# Patient Record
Sex: MALE | Race: WHITE | Employment: FULL TIME | URBAN - METROPOLITAN AREA
[De-identification: names, ages, dates, MRNs, and addresses within clinical notes are randomized per-mention and may not be internally consistent; named-entity substitution may affect disease eponyms.]

---

## 2017-08-15 ENCOUNTER — HOSPITAL ENCOUNTER (OUTPATIENT)
Dept: PHYSICAL THERAPY | Age: 37
Discharge: HOME | End: 2017-08-15
Payer: COMMERCIAL

## 2017-08-15 PROCEDURE — 97161 PT EVAL LOW COMPLEX 20 MIN: CPT | Performed by: PHYSICAL THERAPIST

## 2017-08-15 PROCEDURE — 97112 NEUROMUSCULAR REEDUCATION: CPT | Performed by: PHYSICAL THERAPIST

## 2017-08-15 NOTE — PROGRESS NOTES
PT INITIAL EVALUATION NOTE 2-15    Patient Name: Valerie Sanchez  Date:8/15/2017  : 1980  [x]  Patient  Verified  Payor: BLUE CROSS / Plan: [place] [id number] PPO / Product Type: PPO /    In time:8:00 am  Out time: 9:05 am  Total Treatment Time (min): 60  Visit #: 1     Treatment Area: Pain in left leg [M41.056]    SUBJECTIVE  Pain Level (0-10 scale): 0/10  Any medication changes, allergies to medications, adverse drug reactions, diagnosis change, or new procedure performed?: [] No    [x] Yes (see summary sheet for update)  Subjective:     Pt reports that he has been having some low back and generalized LE fatigue. He mostly feels this when he is running and has been swimming some with some feeling. He reports that he now has a 17 month old and has been feeling back pain when he bends over while holding the baby. He reports that he has trouble feeling his right arch when running. He will still feel tightness in his left lateral hip. He reports that his breathing and respiration is being affected.   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; no pain present  Living Situation: with wife and 17 month old daugter  Pt Goals: return to running and holding his daughter without pain or dysfunction  Barriers: none stated   Motivation: excellent  Substance use: alcohol in recreational usage   FABQ Score: 45(12)  Cognition: A & O x 5        OBJECTIVE/EXAMINATION     See ANTONIO evaluation scanned into chart     20 min Neuromuscular Re-education:  [x]  See flow sheet :   Rationale: improve coordination, improve balance and increase proprioception  to improve the patients ability to return to N ADL skills          With   [] TE   [] TA   [x] neuro   [] other: Patient Education: [x] Review HEP    [x] Progressed/Changed HEP based on:   [] positioning   [] body mechanics   [] transfers   [] heat/ice application    [] other:        Other Objective/Functional Measures: See FOTO scanned into chart    Pain Level (0-10 scale) post treatment: 0/10      ASSESSMENT:      [x]  See Plan of Care      Sarwat Leonard PT, DPT, [facility]  8/15/2017  8:06 AM

## 2017-08-15 NOTE — PROGRESS NOTES
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 Assessment/ nath information: Richard Mccloud has presented to the office today with complaints of low back pain, diffuse LE pain and difficulty with lifting activities with his daughter. 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       Evaluation Complexity History LOW Complexity : Zero comorbidities / personal factors that will impact the outcome / POC; Examination MEDIUM Complexity : 3 Standardized tests and measures addressing body structure, function, activity limitation and / or participation in recreation  ;Presentation LOW Complexity : Stable, uncomplicated  ;Clinical Decision Making MEDIUM Complexity : FOTO score of 26-74  Overall Complexity Rating: LOW     Problem List: pain affecting function, decrease ROM, decrease strength, impaired gait/ balance, decrease ADL/ functional abilitiies, decrease activity tolerance, decrease flexibility/ joint mobility and decrease transfer abilities   Treatment Plan may include any combination of the following: Therapeutic exercise, Therapeutic activities, Neuromuscular re-education, Gait/balance training, Manual therapy, Patient education, Functional mobility training, Home safety training and Stair training  Patient / Family readiness to learn indicated by: asking questions, trying to perform skills and interest  Persons(s) to be included in education: patient (P)  Barriers to Learning/Limitations: None  Patient Goal (s): to be stronger and prevent further pain  Patient Self Reported Health Status: excellent  Rehabilitation Potential: excellent    Short Term Goals: To be accomplished in 3 treatments:  1) Patient will demonstrate Negative Hruska Adduction Drop Test   2) Patient will demonstrate independence with HEP  3) Patient will demonstrate greater than Grade II on Hruska Adduction Lift Test    Long Term Goals: To be accomplished in 8 treatments:  1)Patient will demonstrate Grade IV or Grade V Hruska Adduction Lift Score to allow for functional mobility in home and community settings  2)Pt will demonstrate the ability to ambulate forward and backward achieving bilateral Acetabular Femoral Internal Rotation for pain free mobility  3)Pt will demonstrate the ability to run and jog without subjective complaints or gait deviation  4)Pt will demonstrate independence with discharge HEP to allow for ambulation, sitting and standing without objective dysfunction    Frequency / Duration: Patient to be seen 1 times per week for up to 8  weeks. Patient/ Caregiver education and instruction: self care, activity modification, exercises and other ANTONIO positioning    [x]  Plan of care has been reviewed with PTA Rexine Phalen, PT, DPT, [facility] 8/15/2017 8:15 AM    ________________________________________________________________________    I certify that the above Therapy Services are being furnished while the patient is under my care.  I agree with the treatment plan and certify that this therapy is necessary.     [de-identified] Signature:____________________  Date:____________Time: _________

## 2017-08-22 ENCOUNTER — HOSPITAL ENCOUNTER (OUTPATIENT)
Dept: PHYSICAL THERAPY | Age: 37
Discharge: HOME | End: 2017-08-22
Payer: COMMERCIAL

## 2017-08-22 PROCEDURE — 97112 NEUROMUSCULAR REEDUCATION: CPT | Performed by: PHYSICAL THERAPIST

## 2017-08-22 NOTE — PROGRESS NOTES
PT DAILY TREATMENT NOTE 2-15    Patient Name: Tracy Serrano  Date:2017  : 1980  [x]  Patient  Verified  Payor: BLUE CROSS / Plan: [facility] PPO / Product Type: PPO /    In time:11:30 am  Out time:12:30 pm  Total Treatment Time (min): 55  Visit #: 2     Treatment Area: Pain in left leg [M79.605]    SUBJECTIVE  Pain Level (0-10 scale): 0/10  Any medication changes, allergies to medications, adverse drug reactions, diagnosis change, or new procedure performed?: [x] No    [] Yes (see summary sheet for update)  Subjective functional status/changes:   [] No changes reported  Pt reports that his back is feeling much better. OBJECTIVE     55 min Neuromuscular Re-education:  [x]  See flow sheet :   Rationale: increase ROM, increase strength, improve coordination, improve balance and increase proprioception  to improve the patients ability to ambulate reciprocally            With   [] TE   [] TA   [x] neuro   [] other: Patient Education: [x] Review HEP    [x] Progressed/Changed HEP based on:   [] positioning   [] body mechanics   [] transfers   [] heat/ice application    [] other:      Other Objective/Functional Measures: - HGIR bilaterally, HaDLT bilaterally was weak 3/5     Pain Level (0-10 scale) post treatment: 0/10    ASSESSMENT/Changes in Function:     Patient will continue to benefit from skilled PT services to modify and progress therapeutic interventions, address functional mobility deficits, address ROM deficits, address strength deficits, analyze and address soft tissue restrictions, analyze and cue movement patterns, analyze and modify body mechanics/ergonomics, assess and modify postural abnormalities, address imbalance/dizziness and instruct in home and community integration to attain remaining goals.      []  See Plan of Care  []  See progress note/recertification  []  See Discharge Summary         Progress towards goals / Updated goals:  Pt was able to be progressed since initial visit as he was compliant with HEP at this time.     PLAN  []  Upgrade activities as tolerated     [x]  Continue plan of care  [x]  Update interventions per flow sheet       []  Discharge due to:_  []  Other:_      Jeff Baltazar, PT, DPT, [facility] 8/22/2017  2:27 PM

## 2017-09-01 ENCOUNTER — APPOINTMENT (OUTPATIENT)
Dept: PHYSICAL THERAPY | Age: 37
End: 2017-09-01
Payer: COMMERCIAL

## 2017-09-06 ENCOUNTER — HOSPITAL ENCOUNTER (OUTPATIENT)
Dept: PHYSICAL THERAPY | Age: 37
Discharge: HOME | End: 2017-09-06
Payer: COMMERCIAL

## 2017-09-06 PROCEDURE — 97112 NEUROMUSCULAR REEDUCATION: CPT | Performed by: PHYSICAL THERAPIST

## 2017-09-06 NOTE — PROGRESS NOTES
PT DAILY TREATMENT NOTE 2-15    Patient Name: Fay Ellington  Date:2017  : 1980  [x]  Patient  Verified  Payor: BLUE CROSS / Plan: [place] [id number] PPO / Product Type: PPO /    In time:12:00 pm  Out time:1:00 pm  Total Treatment Time (min): 60  Visit #: 3     Treatment Area: Pain in left leg [M79.605]    SUBJECTIVE  Pain Level (0-10 scale): 0/10  Any medication changes, allergies to medications, adverse drug reactions, diagnosis change, or new procedure performed?: [x] No    [] Yes (see summary sheet for update)  Subjective functional status/changes:   [] No changes reported  Pt reports that he isn't having pain except for some foot soreness. OBJECTIVE     60 min Neuromuscular Re-education:  [x]  See flow sheet :   Rationale: increase ROM, increase strength, improve coordination, improve balance and increase proprioception  to improve the patients ability to ambulate reciprocally            With   [] TE   [] TA   [x] neuro   [] other: Patient Education: [x] Review HEP    [x] Progressed/Changed HEP based on:   [] positioning   [] body mechanics   [] transfers   [] heat/ice application    [] other:      Other Objective/Functional Measures: - HGIR bilaterally, HaDLT bilaterally was strong 3/5     Pain Level (0-10 scale) post treatment: 0/10    ASSESSMENT/Changes in Function:     Patient will continue to benefit from skilled PT services to modify and progress therapeutic interventions, address functional mobility deficits, address ROM deficits, address strength deficits, analyze and address soft tissue restrictions, analyze and cue movement patterns, analyze and modify body mechanics/ergonomics, assess and modify postural abnormalities, address imbalance/dizziness and instruct in home and community integration to attain remaining goals. []  See Plan of Care  []  See progress note/recertification  []  See Discharge Summary         Progress towards goals / Updated goals:   All STG met today.    PLAN  []  Upgrade activities as tolerated     [x]  Continue plan of care  [x]  Update interventions per flow sheet       []  Discharge due to:_  []  Other:_      Duke Alvarez PT, DPT, [facility] 9/6/2017  2:27 PM

## 2017-09-13 ENCOUNTER — APPOINTMENT (OUTPATIENT)
Dept: PHYSICAL THERAPY | Age: 37
End: 2017-09-13
Payer: COMMERCIAL

## 2017-09-26 ENCOUNTER — HOSPITAL ENCOUNTER (OUTPATIENT)
Dept: PHYSICAL THERAPY | Age: 37
Discharge: HOME | End: 2017-09-26
Payer: COMMERCIAL

## 2017-09-26 PROCEDURE — 97112 NEUROMUSCULAR REEDUCATION: CPT | Performed by: PHYSICAL THERAPIST

## 2017-09-26 NOTE — PROGRESS NOTES
PT DAILY TREATMENT NOTE 2-15    Patient Name: Soledad Ruiz  Date:2017  : 1980  [x]  Patient  Verified  Payor: BLUE CROSS / Plan: [place] [employer] [id number] PPO / Product Type: PPO /    In time:12:00 pm  Out time:1:00 pm  Total Treatment Time (min): 60  Visit #: 4     Treatment Area: Pain in left leg [M79.215]    SUBJECTIVE  Pain Level (0-10 scale): 0/10  Any medication changes, allergies to medications, adverse drug reactions, diagnosis change, or new procedure performed?: [x] No    [] Yes (see summary sheet for update)  Subjective functional status/changes:   [] No changes reported  Pt reports that he has been able to run long distances without pain present    OBJECTIVE     55 min Neuromuscular Re-education:  [x]  See flow sheet :   Rationale: increase ROM, increase strength, improve coordination, improve balance and increase proprioception  to improve the patients ability to ambulate reciprocally            With   [] TE   [] TA   [x] neuro   [] other: Patient Education: [x] Review HEP    [x] Progressed/Changed HEP based on:   [] positioning   [] body mechanics   [] transfers   [] heat/ice application    [] other:      Other Objective/Functional Measures:   - HGIR bilaterally   HaDLT bilaterally was strong 3+/5     Pain Level (0-10 scale) post treatment: 0/10    ASSESSMENT/Changes in Function:     Patient will continue to benefit from skilled PT services to modify and progress therapeutic interventions, address functional mobility deficits, address ROM deficits, address strength deficits, analyze and address soft tissue restrictions, analyze and cue movement patterns, analyze and modify body mechanics/ergonomics, assess and modify postural abnormalities, address imbalance/dizziness and instruct in home and community integration to attain remaining goals. []  See Plan of Care  []  See progress note/recertification  []  See Discharge Summary         Progress towards goals / Updated goals:   All STG met today.   Progressing toward LTG with noted weakness bilateral LE with WB    PLAN  []  Upgrade activities as tolerated     [x]  Continue plan of care  [x]  Update interventions per flow sheet       []  Discharge due to:_  []  Other:_        Sarwat Leonard PT, DPT, [facility] 9/26/2017  2:27 PM

## 2017-10-13 ENCOUNTER — HOSPITAL ENCOUNTER (OUTPATIENT)
Dept: PHYSICAL THERAPY | Age: 37
Discharge: HOME | End: 2017-10-13
Payer: COMMERCIAL

## 2017-10-13 PROCEDURE — 97112 NEUROMUSCULAR REEDUCATION: CPT | Performed by: PHYSICAL THERAPIST

## 2017-10-13 NOTE — PROGRESS NOTES
PT DAILY TREATMENT NOTE 2-15    Patient Name: Carla Guthrie  Date:10/13/2017  : 1980  [x]  Patient  Verified  Payor: BLUE MAXWELL / Plan: [place] [employer] [id number] PPO / Product Type: PPO /    In time:12:00 pm  Out time:1:00 pm  Total Treatment Time (min): 60  Visit #: 5     Treatment Area: Pain in left leg [M79.065]    SUBJECTIVE  Pain Level (0-10 scale): 0/10  Any medication changes, allergies to medications, adverse drug reactions, diagnosis change, or new procedure performed?: [x] No    [] Yes (see summary sheet for update)  Subjective functional status/changes:   [] No changes reported  Pt reports that he is doing well and is running much better. OBJECTIVE     55 min Neuromuscular Re-education:  [x]  See flow sheet :   Rationale: increase ROM, increase strength, improve coordination, improve balance and increase proprioception  to improve the patients ability to ambulate reciprocally            With   [] TE   [] TA   [x] neuro   [] other: Patient Education: [x] Review HEP    [x] Progressed/Changed HEP based on:   [] positioning   [] body mechanics   [] transfers   [] heat/ice application    [] other:      Other Objective/Functional Measures:   - HGIR bilaterally   HaDLT bilaterally 3+/5  - PADT bilaterally     Pain Level (0-10 scale) post treatment: 0/10    ASSESSMENT/Changes in Function:     Patient will continue to benefit from skilled PT services to modify and progress therapeutic interventions, address functional mobility deficits, address ROM deficits, address strength deficits, analyze and address soft tissue restrictions, analyze and cue movement patterns, analyze and modify body mechanics/ergonomics, assess and modify postural abnormalities, address imbalance/dizziness and instruct in home and community integration to attain remaining goals.      []  See Plan of Care  []  See progress note/recertification  []  See Discharge Summary         Progress towards goals / Updated goals:  Pt has met LTG 1 and 2 per POC    PLAN  []  Upgrade activities as tolerated     [x]  Continue plan of care  [x]  Update interventions per flow sheet       []  Discharge due to:_  [x]  Other: likely D/C at next visit.         Kade Price, PT, DPT, [facility] 10/13/2017  2:27 PM

## 2018-01-18 NOTE — ANCILLARY DISCHARGE INSTRUCTIONS
[address]  Phone: [phone]  Fax: [phone]    Discharge Summary  2-15    Patient name: Mia Rose  : 1980  Provider#: [id number]  Referral source: Referred, Self, MD      Medical/Treatment Diagnosis: Pain in left leg [M79.605]     Prior Hospitalization: see medical history     Comorbidities: See Plan of Care  Prior Level of Function:See Plan of Care  Medications: Verified on Patient Summary List    Start of Care: 8/15/2017     Onset Date:ongoing for several months prior to eval   Visits from Start of Care: 5     Missed Visits: 0  Reporting Period : 8/15/2017 to 10/13/2017      ASSESSMENT/SUMMARY OF CARE: Gentry participated and progressed well in treatment up to 5th visit. He was not seen past this visit secondary to work and home obligations and will return PRN as his schedule allows.       Goal:1) Patient will demonstrate Negative Hruska Adduction Drop Test   Status at last note/certification:  Status at discharge: met    Goal:2) Patient will demonstrate independence with HEP  Status at last note/certification:  Status at discharge: met    Goal:3) Patient will demonstrate greater than Grade II on Hruska Adduction Lift Test  Status at last note/certification:  Status at discharge: met    Long Term goals: Progressing toward all at the time of last visit  1)Patient will demonstrate Grade IV or Grade V Hruska Adduction Lift Score to allow for functional mobility in home and community settings  2)Pt will demonstrate the ability to ambulate forward and backward achieving bilateral Acetabular Femoral Internal Rotation for pain free mobility  3)Pt will demonstrate the ability to run without subjective complaints or gait deviation  4)Pt will demonstrate independence with discharge HEP to allow for ambulation, sitting and standing without objective dysfunction        RECOMMENDATIONS:  [x]Discontinue therapy: []Patient has reached or is progressing toward set goals      [x]Patient is non-compliant or has abdicated secondary to life circumstances      []Due to lack of appreciable progress towards set goals      []Other    Anurag Kate, PT 1/18/2018 5:04 PM

## 2018-10-15 ENCOUNTER — HOSPITAL ENCOUNTER (OUTPATIENT)
Dept: PHYSICAL THERAPY | Age: 38
Discharge: HOME | End: 2018-10-15
Payer: COMMERCIAL

## 2018-10-15 PROCEDURE — 97112 NEUROMUSCULAR REEDUCATION: CPT | Performed by: PHYSICAL THERAPIST

## 2018-10-15 PROCEDURE — 97161 PT EVAL LOW COMPLEX 20 MIN: CPT | Performed by: PHYSICAL THERAPIST

## 2018-10-15 NOTE — PROGRESS NOTES
Plan of Care/ Statement of Necessity for Physical Therapy Services Patient name: Wanda Vang  : 1980  Provider#: [id number] Referral source: Referred, Self, MD     
Medical/Treatment Diagnosis: Left leg pain [M79.605] Prior Hospitalization: see medical history Comorbidities: alcohol usage, visual impairment Prior Level of Function: running, , childcare, home care Medications: Verified on Patient Summary List 
 
Start of Care: 10/15/2018      Onset Date: 1 month ago The Plan of Care and following information is based on the information from the initial evaluation. Assessment/ key information: Isai Roy presents to our office with a need to resume PT for R LE and right low back pain that is returning and he is unable to resolve this himself with a HEP. He will benefit from skilled PT prior to D/C to address the below and allow return to premorbid status with no restrictions present. Problem List: pain affecting function, decrease ROM, decrease strength, edema affecting function, impaired gait/ balance, decrease ADL/ functional abilitiies, decrease activity tolerance and decrease transfer abilities Treatment Plan may include any combination of the following: Neuromuscular re-education, Manual therapy, Patient education, Self Care training, Functional mobility training, Home safety training and Stair training Patient / Family readiness to learn indicated by: asking questions, trying to perform skills and interest 
Persons(s) to be included in education: patient (P) Barriers to Learning/Limitations: None Patient Goal (s): to be able to run without pain Patient Self Reported Health Status: good Rehabilitation Potential: excellent Short Term Goals:  To be accomplished in 3 treatments: 
1) Patient will demonstrate Negative Hruska Adduction Drop Test  
2) Patient will demonstrate independence with HEP 
 3) Patient will demonstrate greater than Grade II on Hruska Adduction Lift Test 
 
Long Term Goals: To be accomplished in 10 treatments: 
1)Patient will demonstrate Grade IV or Grade V Hruska Adduction Lift Score to allow for functional mobility in home and community settings 2)Pt will demonstrate the ability to ambulate forward and backward achieving bilateral Acetabular Femoral Internal Rotation for pain free mobility 3)Pt will demonstrate the ability to run and climb stairs without subjective complaints or gait deviation 4)Pt will demonstrate independence with discharge HEP to allow for ambulation, sitting and standing without objective dysfunction Frequency / Duration: Patient to be seen 1 times per week for up to10 treatments. Patient/ Caregiver education and instruction: self care, activity modification and exercises 
 
[x]  Plan of care has been reviewed with JAY Chen, PT, DPT, [facility] 10/15/2018 10:34 AM 
 
________________________________________________________________________ I certify that the above Therapy Services are being furnished while the patient is under my care. I agree with the treatment plan and certify that this therapy is necessary. [de-identified] Signature:____________________  Date:____________Time: _________

## 2018-10-15 NOTE — PROGRESS NOTES
PT INITIAL EVALUATION NOTE - [facility] 2-15 Patient Name: Manoj Fink Date:10/15/2018 : 1980 [x]  Patient  Verified Payor: BLUE CROSS / Plan: [facility] PPO / Product Type: PPO / In time:10:30 am  Out time:11:30 am 
Total Treatment Time (min): 60 Total Timed Codes (min): 15 
1:1 Treatment Time ( only): 6-  
Visit #: 1 Treatment Area: Left leg pain [M79.609] SUBJECTIVE Pain Level (0-10 scale): 0-1/10 Any medication changes, allergies to medications, adverse drug reactions, diagnosis change, or new procedure performed?: [] No    [x] Yes (see summary sheet for update) Subjective:   
Rebecca Strong states that he is having right sided low back pain, right hip pain and is unable to run as much as he would like to be able to. He is working a lot and is sitting a lot with this work. He feels like he needs to get back to do more specific ANTONIO exercises to combat this. 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 Barriers: none noted Motivation: good to excellent Substance use: alcohol usage FABQ Score:  See FOTO scanned into chart Cognition: A & O x 4 OBJECTIVE/EXAMINATION Postural Restoration [facility]-ER) Evaluation Left   Right Standing Standing Reach Test (M)    5 inches Functional Squat Test  (P)    Level 3-4 with v.c.    
 
Sitting FA IR R.O.M. (M)     41 degrees  48 degrees FA ER R.O.M.  (M)     51 degrees  42 degrees FA IR Strength (M)          
FA ER Strength (M) Sidelying Adduction Drop Test (M)    +   + Hruska Adduction Lift Test (M)   3   2 Hruska Abduction Lift Test (M) Pelvic Baldwin Drop Test (PADT) (P)  +   - 
 Passive Abduction Raise Test (PART) (P) Passive IP ER Expansion Test (P) Supine Extension Drop Test (M) Trunk Rotation (M) Straight Leg Raise (M) POSTURAL RESPIRATION EXAMINATION Left   Right Apical Expansion (R)     Limited bilaterally   * Horizontal Abduction (R)    30 degrees  15 degrees Shoulder Flexion (R)      degrees   degrees HG IR (R)      73 degrees  57 degrees Subclavius Flexibility (R)    Limited  X  Limited X Elevated and Externally Rotated Ant. Ribs (R) Min bilaterally CERVICAL-CRANIO-MANDIBULAR EXAM Left   Right Cervical Axial Rotation    60 degrees  80 degrees Horizontal Abduction      degrees   degrees Mandibular Opening      mm Mandibular Lateral Trusion with Protrusion Cervical Extension     Min limited 20 min Neuromuscular Re-education:  [x]  See flow sheet :  
Rationale: increase ROM, increase strength, improve coordination, improve balance and increase proprioception  to improve the patients ability to return to N walking without pain presnet With 
 [] TE 
 [] TA [x] neuro 
 [] other: Patient Education: [x] Review HEP [] Progressed/Changed HEP based on:  
[] positioning   [] body mechanics   [] transfers   [] heat/ice application   
[] other:   
 
Other Objective/Functional Measures: see FOTO scanned into chart Pain Level (0-10 scale) post treatment: 0/10 ASSESSMENT/Changes in Function:  
 
[x]  See Plan of Care Genny Dye PT, DPT, [facility] 10/15/2018  10:33 AM

## 2018-10-29 ENCOUNTER — APPOINTMENT (OUTPATIENT)
Dept: PHYSICAL THERAPY | Age: 38
End: 2018-10-29
Payer: COMMERCIAL

## 2018-11-14 ENCOUNTER — HOSPITAL ENCOUNTER (OUTPATIENT)
Dept: PHYSICAL THERAPY | Age: 38
Discharge: HOME | End: 2018-11-14
Payer: COMMERCIAL

## 2018-11-14 PROCEDURE — 97112 NEUROMUSCULAR REEDUCATION: CPT | Performed by: PHYSICAL THERAPIST

## 2018-11-14 NOTE — PROGRESS NOTES
PT DAILY TREATMENT NOTE 2-15 Patient Name: Thompson Ahumada Date:2018 : 1980 [x]  Patient  Verified Payor: BLUE CROSS / Plan: [facility] PPO / Product Type: PPO / In time:10:00 am  Out time:11:00 am 
Total Treatment Time (min): 60 Visit #: 2 Treatment Area: Left leg pain [M79.605] SUBJECTIVE Pain Level (0-10 scale): 0/10 Any medication changes, allergies to medications, adverse drug reactions, diagnosis change, or new procedure performed?: [x] No    [] Yes (see summary sheet for update) Subjective functional status/changes:   [] No changes reported Pt reports that he is running well and not having a lot of pain. OBJECTIVE 60 min Neuromuscular Re-education:  [x]  See flow sheet :  
Rationale: increase strength, improve coordination, improve balance and increase proprioception  to improve the patients ability to return to N ADL skills With 
 [] TE 
 [] TA [x] neuro 
 [] other: Patient Education: [x] Review HEP [x] Progressed/Changed HEP based on:  
[] positioning   [] body mechanics   [] transfers   [] heat/ice application   
[] other:   
 
Other Objective/Functional Measures:  
- HGIR bilaterally 
- HaDDT bilaterally - PADT bilaterally Level 3 lift HaDLT bilaterally Apical expansion min limited Pain Level (0-10 scale) post treatment: 0/10 ASSESSMENT/Changes in Function:  
Pt demonstrates excellent compliance with HEP. Patient will continue to benefit from skilled PT services to modify and progress therapeutic interventions, address functional mobility deficits, address ROM deficits, address strength deficits, analyze and address soft tissue restrictions, analyze and cue movement patterns, analyze and modify body mechanics/ergonomics, assess and modify postural abnormalities, address imbalance/dizziness and instruct in home and community integration to attain remaining goals. []  See Plan of Care []  See progress note/recertification 
[]  See Discharge Summary Progress towards goals / Updated goals: 
Pt has progressed well with HEP. PLAN 
[]  Upgrade activities as tolerated     [x]  Continue plan of care 
[]  Update interventions per flow sheet      
[]  Discharge due to:_ 
[]  Other:_ Hiram Holland, PT, DPT, [facility]  11/14/2018  12:59 PM

## 2018-11-28 ENCOUNTER — APPOINTMENT (OUTPATIENT)
Dept: PHYSICAL THERAPY | Age: 38
End: 2018-11-28
Payer: COMMERCIAL

## 2018-12-12 ENCOUNTER — APPOINTMENT (OUTPATIENT)
Dept: PHYSICAL THERAPY | Age: 38
End: 2018-12-12

## 2019-01-03 ENCOUNTER — HOSPITAL ENCOUNTER (OUTPATIENT)
Dept: PHYSICAL THERAPY | Age: 39
Discharge: HOME | End: 2019-01-03
Payer: COMMERCIAL

## 2019-01-03 PROCEDURE — 97112 NEUROMUSCULAR REEDUCATION: CPT | Performed by: PHYSICAL THERAPIST

## 2019-01-03 NOTE — PROGRESS NOTES
PT DAILY TREATMENT NOTE 2-15 Patient Name: Soledad Ruiz Date:1/3/2019 : 1980 [x]  Patient  Verified Payor: BLUE CROSS / Plan: [facility] PPO / Product Type: PPO / In time:9:00 am  Out time:9:45 am 
Total Treatment Time (min): 45 Visit #: 3 Treatment Area: Left leg pain [M79.605] SUBJECTIVE Pain Level (0-10 scale): 0/10 Any medication changes, allergies to medications, adverse drug reactions, diagnosis change, or new procedure performed?: [x] No    [] Yes (see summary sheet for update) Subjective functional status/changes:   [] No changes reported Pt reports that he injured his L knee when he was loading his daughter into his car. This has improved recently and has been able to resume running. He thinks he 'sprained' his knee. OBJECTIVE 45 min Neuromuscular Re-education:  [x]  See flow sheet :  
Rationale: increase strength, improve coordination, improve balance and increase proprioception  to improve the patients ability to return to N ADL skills and running without gait deviation present With 
 [] TE 
 [] TA [x] neuro 
 [] other: Patient Education: [x] Review HEP [x] Progressed/Changed HEP based on:  
[] positioning   [] body mechanics   [] transfers   [] heat/ice application   
[] other:   
 
Other Objective/Functional Measures:  
- HGIR bilaterally 
- HaDDT bilaterally - PADT bilaterally Level 3+ to weak 4 lift HaDLT bilaterally Apical expansion improved and only min limited Squat testing strong 4 to weak level 5 Pain Level (0-10 scale) post treatment: 0/10 ASSESSMENT/Changes in Function:  
Pt demonstrates excellent compliance with HEP at [facility].  
Patient will continue to benefit from skilled PT services to modify and progress therapeutic interventions, address functional mobility deficits, address ROM deficits, address strength deficits, analyze and address soft tissue restrictions, analyze and cue movement patterns, analyze and modify body mechanics/ergonomics, assess and modify postural abnormalities, address imbalance/dizziness and instruct in home and community integration to attain remaining goals. []  See Plan of Care [x]  See progress note/recertification 
[]  See Discharge Summary Progress towards goals / Updated goals: 
Excellent progress noted and pt is progressing well with HEP. He is progressing well with running planning and however is not doing well with running secondary to L knee injury sustained when loading daughter into his car. PLAN 
[]  Upgrade activities as tolerated     [x]  Continue plan of care [x]  Update interventions per flow sheet      
[]  Discharge due to:_ 
[]  Other:_ Limmie Lesch, PT, DPT, [facility]  1/3/2019  10:03 AM

## 2019-01-03 NOTE — PROGRESS NOTES
Progress Notes Patient name: Tanvi Yoder  : 1980  Provider#: [id number] Referral source: Radha Reynoso MD     
Medical/Treatment Diagnosis: Left leg pain [M79.605] Prior Hospitalization: see medical history Comorbidities: alcohol usage, visual impairment Prior Level of Function: running, , childcare, home care Medications: Verified on Patient Summary List 
 
Start of Care: 10/15/2018      Visits Since Start of Care: 3 Assessment/ key information: Sara Dunbar has progressed well with current POC and HEP and is able to ambulate without pain present. He will cont to progress with current POC toward the attainment of LTG and ability to perform all IADL skills without pain or compensation. Problem List: pain affecting function, decrease ROM, decrease strength, edema affecting function, impaired gait/ balance, decrease ADL/ functional abilitiies, decrease activity tolerance and decrease transfer abilities Treatment Plan may include any combination of the following: Neuromuscular re-education, Manual therapy, Patient education, Self Care training, Functional mobility training, Home safety training and Stair training Short Term Goals: To be accomplished in 3 treatments: ALL HAVE BEEN MET 
1) Patient will demonstrate Negative Hruska Adduction Drop Test  
2) Patient will demonstrate independence with HEP 3) Patient will demonstrate greater than Grade II on Hruska Adduction Lift Test 
 
Long Term Goals: To be accomplished in 10 treatments:  
1)Patient will demonstrate Grade IV or Grade V Hruska Adduction Lift Score to allow for functional mobility in home and community settings PARTIALLY MET 
2)Pt will demonstrate the ability to ambulate forward and backward achieving bilateral Acetabular Femoral Internal Rotation for pain free mobility MET INTERMITTANTLY 3)Pt will demonstrate the ability to run and climb stairs without subjective complaints or gait deviation NOT MET 
 4)Pt will demonstrate independence with discharge HEP to allow for ambulation, sitting and standing without objective dysfunction MET FOR CURRENT HEP Frequency / Duration: Patient to be seen 1 times per week for up to 4 more treatments. Patient/ Caregiver education and instruction: self care, activity modification and exercises Rexine Phalen, PT, DPT, [facility] 1/3/2019 10:34 AM

## 2019-01-25 ENCOUNTER — HOSPITAL ENCOUNTER (OUTPATIENT)
Dept: PHYSICAL THERAPY | Age: 39
Discharge: HOME | End: 2019-01-25
Payer: COMMERCIAL

## 2019-01-25 PROCEDURE — 97112 NEUROMUSCULAR REEDUCATION: CPT | Performed by: PHYSICAL THERAPIST

## 2019-01-25 NOTE — PROGRESS NOTES
PT DAILY TREATMENT NOTE 2-15 Patient Name: Kristie Turner Date:2019 : 1980 [x]  Patient  Verified Payor: BLUE CROSS / Plan: [facility] PPO / Product Type: PPO / In time:9:10 am  Out time:9:55 am 
Total Treatment Time (min): 45 Visit #: 4 Treatment Area: Left leg pain [M79.605] SUBJECTIVE Pain Level (0-10 scale): 2/10 Any medication changes, allergies to medications, adverse drug reactions, diagnosis change, or new procedure performed?: [x] No    [] Yes (see summary sheet for update) Subjective functional status/changes:   [] No changes reported Pt reports that he is feeling like he is getting L glute fatigue and accompanying L knee pain early on in his run and is feeling like it is because he has not been able to do a whole lot of his HEP or anything relative to that since he has had a sinus infection. OBJECTIVE 45 min Neuromuscular Re-education:  [x]  See flow sheet :  
Rationale: increase strength, improve coordination, improve balance and increase proprioception  to improve the patients ability to return to N ADL skills and running without gait deviation present With 
 [] TE 
 [] TA [x] neuro 
 [] other: Patient Education: [x] Review HEP [x] Progressed/Changed HEP based on:  
[] positioning   [] body mechanics   [] transfers   [] heat/ice application   
[] other:   
 
Other Objective/Functional Measures:  
- HGIR bilaterally 
- HaDDT L and + on R Level 3 to HaDLT bilaterally Apical more limited than at last visit Post testing of all measurements indicated improvement and 'clearing' of all testing. Pain Level (0-10 scale) post treatment: 0/10 ASSESSMENT/Changes in Function:  
Pt demonstrates moderate compliance with HEP and will benefit from altered HEP following today's visit.  
 
Patient will continue to benefit from skilled PT services to modify and progress therapeutic interventions, address functional mobility deficits, address ROM deficits, address strength deficits, analyze and address soft tissue restrictions, analyze and cue movement patterns, analyze and modify body mechanics/ergonomics, assess and modify postural abnormalities, address imbalance/dizziness and instruct in home and community integration to attain remaining goals. []  See Plan of Care [x]  See progress note from last visit/recertification 
[]  See Discharge Summary Progress towards goals / Updated goals: 
Pt has achieved all STG at this time. Long Term Goals: progressing toward all 1)Patient will demonstrate Grade IV or Grade V Hruska Adduction Lift Score to allow for functional mobility in home and community settings 2)Pt will demonstrate the ability to ambulate forward and backward achieving bilateral Acetabular Femoral Internal Rotation for pain free mobility 3)Pt will demonstrate the ability to run without subjective complaints or gait deviation 4)Pt will demonstrate independence with discharge HEP to allow for ambulation, sitting and standing without objective dysfunction PLAN 
[]  Upgrade activities as tolerated     [x]  Continue plan of care [x]  Update interventions per flow sheet      
[]  Discharge due to:_ 
[]  Other:_ Dayana Hammond, PT, DPT, [facility]  1/25/2019

## 2019-02-08 ENCOUNTER — HOSPITAL ENCOUNTER (OUTPATIENT)
Dept: PHYSICAL THERAPY | Age: 39
Discharge: HOME | End: 2019-02-08
Payer: COMMERCIAL

## 2019-02-08 PROCEDURE — 97014 ELECTRIC STIMULATION THERAPY: CPT | Performed by: PHYSICAL THERAPIST

## 2019-02-08 PROCEDURE — 97110 THERAPEUTIC EXERCISES: CPT | Performed by: PHYSICAL THERAPIST

## 2019-02-08 NOTE — PROGRESS NOTES
PT DAILY TREATMENT NOTE 2-15 Patient Name: Terry Chan Date:2019 : 1980 [x]  Patient  Verified Payor: BLUE CROSS / Plan: [facility] PPO / Product Type: PPO / In time:9:35 am  Out time: 10:45 am 
Total Treatment Time (min): 70 Visit #: 2 Treatment Area: Left leg pain [M79.605] SUBJECTIVE Pain Level (0-10 scale): 5-6/10 Any medication changes, allergies to medications, adverse drug reactions, diagnosis change, or new procedure performed?: [x] No    [] Yes (see summary sheet for update) Subjective functional status/changes:   [] No changes reported Pt reports L knee pain that is continuing and is keeping him from fully WB over L LE. He feels like there is something going on inside of his L knee. He will see the orthopod next week. OBJECTIVE 45 min Therapeutic Exercise:  [x]  See flow sheet : for L knee injury Rationale: increase strength, improve coordination, improve balance and increase proprioception  to improve the patients ability to return to N ADL skills and running without gait deviation present CP to post and ant L knee with 4 pad IFC to L ant knee in supine with L knee ext and N tissue olu x 15 minutes. With 
 [] TE 
 [] TA [x] neuro 
 [] other: Patient Education: [x] Review HEP [x] Progressed/Changed HEP based on:  
[] positioning   [] body mechanics   [] transfers   [] heat/ice application   
[] other:   
 
Other Objective/Functional Measures:  
L patellar mobility limited in clock directions Full knee ext L with boggy end feel L knee flex full with tightness present at end range L quad tone Fair Pain Level (0-10 scale) post treatment: 0/10 ASSESSMENT/Changes in Function:  
Pt demonstrates the possibiity of a L meniscus tear or internal derangement of some type. He will benefit from seeing an orthopod for diagnostics.  
 
Patient will continue to benefit from skilled PT services to modify and progress therapeutic interventions, address functional mobility deficits, address ROM deficits, address strength deficits, analyze and address soft tissue restrictions, analyze and cue movement patterns, analyze and modify body mechanics/ergonomics, assess and modify postural abnormalities, address imbalance/dizziness and instruct in home and community integration to attain remaining goals. []  See Plan of Care 
[]  See progress note from last visit/recertification 
[]  See Discharge Summary Progress towards goals / Updated goals: 
Pt has demonstrated limited progress since last visit secondary to L knee symptoms. Long Term Goals: progressing toward all 1)Patient will demonstrate Grade IV or Grade V Hruska Adduction Lift Score to allow for functional mobility in home and community settings 2)Pt will demonstrate the ability to ambulate forward and backward achieving bilateral Acetabular Femoral Internal Rotation for pain free mobility 3)Pt will demonstrate the ability to run without subjective complaints or gait deviation 4)Pt will demonstrate independence with discharge HEP to allow for ambulation, sitting and standing without objective dysfunction PLAN 
[]  Upgrade activities as tolerated     [x]  Continue plan of care [x]  Update interventions per flow sheet      
[]  Discharge due to:_ 
[x]  Other:_  To see orthopod on Wednesday Nisha Jimenez, PT, DPT, [facility]  2/8/2019

## 2019-02-20 ENCOUNTER — HOSPITAL ENCOUNTER (OUTPATIENT)
Dept: PHYSICAL THERAPY | Age: 39
Discharge: HOME | End: 2019-02-20
Payer: COMMERCIAL

## 2019-02-20 PROCEDURE — 97112 NEUROMUSCULAR REEDUCATION: CPT | Performed by: PHYSICAL THERAPIST

## 2019-02-20 NOTE — PROGRESS NOTES
PT DAILY TREATMENT NOTE 2-15 Patient Name: Candace Ortiz Date:2019 : 1980 [x]  Patient  Verified Payor: BLUE CROSS / Plan: [facility] PPO / Product Type: PPO / In time: 12:15 pm  Out time: 1:00 pm 
Total Treatment Time (min): 45 Visit #: 6 Treatment Area: Left leg pain [M79.605] SUBJECTIVE Pain Level (0-10 scale): 1/10 at rest; 5/10 at worst 
Any medication changes, allergies to medications, adverse drug reactions, diagnosis change, or new procedure performed?: [x] No    [] Yes (see summary sheet for update) Subjective functional status/changes:   [] No changes reported Pt reports that he saw Dr. Sahil Garcia after last visit and he diagnosed this as patellar tendonitis. He did an x-ray and this was negative. He will f/u with Dr. Sahil Garcia in a few weeks. OBJECTIVE 45 min Neuromuscular Re-education:  [x]  See flow sheet :   
Rationale: increase strength, improve coordination, improve balance and increase proprioception  to improve the patients ability to return to N ADL skills and running without gait deviation present With 
 [] TE 
 [] TA [x] neuro 
 [] other: Patient Education: [x] Review HEP [x] Progressed/Changed HEP based on:  
[] positioning   [] body mechanics   [] transfers   [] heat/ice application   
[] other:   
 
Other Objective/Functional Measures:  
L patellar mobility limited in clock directions in full knee extension 
+ bilateral HGIR 
+ HaDDT L > R HaDLT R 2, L 3- 
 
Pain Level (0-10 scale) post treatment: 0/10 ASSESSMENT/Changes in Function:  
Pt demonstrates that his is not maintaining neutrality and this deviation can be causing L knee pain at current time.  
 
Patient will continue to benefit from skilled PT services to modify and progress therapeutic interventions, address functional mobility deficits, address ROM deficits, address strength deficits, analyze and address soft tissue restrictions, analyze and cue movement patterns, analyze and modify body mechanics/ergonomics, assess and modify postural abnormalities, address imbalance/dizziness and instruct in home and community integration to attain remaining goals. []  See Plan of Care 
[]  See progress note from last visit/recertification 
[]  See Discharge Summary Progress towards goals / Updated goals: 
Pt has demonstrated limited progress since last visit secondary to L knee symptoms. Long Term Goals: progressing toward all 1)Patient will demonstrate Grade IV or Grade V Hruska Adduction Lift Score to allow for functional mobility in home and community settings 2)Pt will demonstrate the ability to ambulate forward and backward achieving bilateral Acetabular Femoral Internal Rotation for pain free mobility 3)Pt will demonstrate the ability to run without subjective complaints or gait deviation 4)Pt will demonstrate independence with discharge HEP to allow for ambulation, sitting and standing without objective dysfunction PLAN 
[]  Upgrade activities as tolerated     [x]  Continue plan of care [x]  Update interventions per flow sheet      
[]  Discharge due to:_ 
[x]  Other:_  F/u 2.25.2019 Shanna Montoya, PT, DPT, [facility]  2/20/2019

## 2019-02-25 ENCOUNTER — HOSPITAL ENCOUNTER (OUTPATIENT)
Dept: PHYSICAL THERAPY | Age: 39
Discharge: HOME | End: 2019-02-25
Payer: COMMERCIAL

## 2019-02-25 PROCEDURE — 97112 NEUROMUSCULAR REEDUCATION: CPT | Performed by: PHYSICAL THERAPIST

## 2019-02-25 NOTE — PROGRESS NOTES
PT DAILY TREATMENT NOTE 2-15 Patient Name: Soledad Ruiz Date:2019 : 1980 [x]  Patient  Verified Payor: BLUE CROSS / Plan: [facility] PPO / Product Type: PPO / In time: 3:40 pm  Out time: 4:25  pm 
Total Treatment Time (min): 45 Visit #: 0 Treatment Area: Left leg pain [M79.605] SUBJECTIVE Pain Level (0-10 scale): 2/10 at rest 
Any medication changes, allergies to medications, adverse drug reactions, diagnosis change, or new procedure performed?: [x] No    [] Yes (see summary sheet for update) Subjective functional status/changes:   [] No changes reported Pt reports that he was compliant with his HEP for several days since his last visit and tried to run yesterday making it about 5 blocks. OBJECTIVE 45 min Neuromuscular Re-education:  [x]  See flow sheet :   
Rationale: increase strength, improve coordination, improve balance and increase proprioception  to improve the patients ability to return to N ADL skills and running without gait deviation present With 
 [] TE 
 [] TA [x] neuro 
 [] other: Patient Education: [x] Review HEP [x] Progressed/Changed HEP based on:  
[] positioning   [] body mechanics   [] transfers   [] heat/ice application   
[] other:   
 
Other Objective/Functional Measures: L knee limited in end range flex and ext ROM 
- bilateral HGIR 
- HaDDT L and R HaDLT R 3, L 3 Pain Level (0-10 scale) post treatment: 0/10 ASSESSMENT/Changes in Function:  
Pt may have internal derangement of some type L knee. Pt will benefit from further diagnostic testing at this time.   
 
Patient will continue to benefit from skilled PT services to modify and progress therapeutic interventions, address functional mobility deficits, address ROM deficits, address strength deficits, analyze and address soft tissue restrictions, analyze and cue movement patterns, analyze and modify body mechanics/ergonomics, assess and modify postural abnormalities, address imbalance/dizziness and instruct in home and community integration to attain remaining goals. []  See Plan of Care [x]  See progress note/recertification 
[]  See Discharge Summary Progress towards goals / Updated goals: 
See progress note Long Term Goals: progressing toward 1)Patient will demonstrate Grade IV or Grade V Hruska Adduction Lift Score to allow for functional mobility in home and community settings 2)Pt will demonstrate the ability to ambulate forward and backward achieving bilateral Acetabular Femoral Internal Rotation for pain free mobility 3)Pt will demonstrate the ability to run without subjective complaints or gait deviation 4)Pt will demonstrate independence with discharge HEP to allow for ambulation, sitting and standing without objective dysfunction PLAN 
[]  Upgrade activities as tolerated     [x]  Continue plan of care 
[]  Update interventions per flow sheet      
[]  Discharge due to:_ 
[x]  Other:_  On hold pending diagnostic testing Jeff Baltazar, PT, DPT, [facility]  2/25/2019

## 2019-02-25 NOTE — PROGRESS NOTES
Progress Notes Patient name: Stacey Barnes  : 1980  Provider#: [id number] Referral source: Natacha Tellez MD     
Medical/Treatment Diagnosis: Left leg pain [M79.605] Prior Hospitalization: see medical history Comorbidities: alcohol usage, visual impairment Prior Level of Function: running, , childcare, home care Medications: Verified on Patient Summary List 
 
Start of Care: 10/15/2018      Visits Since Start of Care: 7 Assessment/ key information: Radha has stopped progressing wtih current POC secondary to possible internal derangement L knee. He will be f/u with the orthopod he saw, Dr. César Berrios, to determine further POC. Pt will be placed on hold at this time pending investigation of L knee. Problem List: pain affecting function, decrease ROM, decrease strength, edema affecting function, impaired gait/ balance, decrease ADL/ functional abilitiies, decrease activity tolerance and decrease transfer abilities Treatment Plan may include any combination of the following: Neuromuscular re-education, Manual therapy, Patient education, Self Care training, Functional mobility training, Home safety training and Stair training Short Term Goals: To be accomplished in 3 treatments: ALL HAVE BEEN MET 
1) Patient will demonstrate Negative Hruska Adduction Drop Test  
2) Patient will demonstrate independence with HEP 3) Patient will demonstrate greater than Grade II on Hruska Adduction Lift Test 
 
Long Term Goals: To be accomplished in 10 treatments:  
1)Patient will demonstrate Grade IV or Grade V Hruska Adduction Lift Score to allow for functional mobility in home and community settings PARTIALLY MET 
2)Pt will demonstrate the ability to ambulate forward and backward achieving bilateral Acetabular Femoral Internal Rotation for pain free mobility MET INTERMITTANTLY 3)Pt will demonstrate the ability to run and climb stairs without subjective complaints or gait deviation NOT MET 
4)Pt will demonstrate independence with discharge HEP to allow for ambulation, sitting and standing without objective dysfunction MET FOR CURRENT HEP Frequency / Duration: Patient to be seen 1 times per week for up to 4 more treatments. Patient/ Caregiver education and instruction: self care, activity modification and exercises Michael Coy, PT, DPT, [facility] 2/25/2019

## 2019-02-27 ENCOUNTER — APPOINTMENT (OUTPATIENT)
Dept: PHYSICAL THERAPY | Age: 39
End: 2019-02-27
Payer: COMMERCIAL

## 2019-08-01 NOTE — ANCILLARY DISCHARGE INSTRUCTIONS
Discharge Summary Patient name: Stacey Barnes  : 1980  Provider#: [id number] Referral source: Natacha Tellez MD     
Medical/Treatment Diagnosis: Left leg pain [M79.605] Prior Hospitalization: see medical history Comorbidities: alcohol usage, visual impairment Prior Level of Function: running, , childcare, home care Medications: Verified on Patient Summary List 
 
Start of Care: 10/15/2018      Visits Since Start of Care: 7 Assessment/ key information: Slim Madison has not been seen since 2019. He will continue with HEP and will return PRN. Short Term Goals: To be accomplished in 3 treatments: ALL HAVE BEEN MET at time of last visit 1) Patient will demonstrate Negative Hruska Adduction Drop Test  
2) Patient will demonstrate independence with HEP 3) Patient will demonstrate greater than Grade II on Hruska Adduction Lift Test 
 
Long Term Goals: To be accomplished in 10 treatments: (evaluated at time of last visit) 1)Patient will demonstrate Grade IV or Grade V Hruska Adduction Lift Score to allow for functional mobility in home and community settings PARTIALLY MET 
2)Pt will demonstrate the ability to ambulate forward and backward achieving bilateral Acetabular Femoral Internal Rotation for pain free mobility MET INTERMITTANTLY 3)Pt will demonstrate the ability to run and climb stairs without subjective complaints or gait deviation NOT MET 
4)Pt will demonstrate independence with discharge HEP to allow for ambulation, sitting and standing without objective dysfunction MET FOR CURRENT HEP Nael Hartley, PT, DPT, [facility] 2019

## 2019-10-16 ENCOUNTER — HOSPITAL ENCOUNTER (OUTPATIENT)
Dept: PHYSICAL THERAPY | Age: 39
Discharge: HOME | End: 2019-10-16
Payer: COMMERCIAL

## 2019-10-16 PROCEDURE — 97112 NEUROMUSCULAR REEDUCATION: CPT | Performed by: PHYSICAL THERAPIST

## 2019-10-16 PROCEDURE — 97161 PT EVAL LOW COMPLEX 20 MIN: CPT | Performed by: PHYSICAL THERAPIST

## 2019-10-16 NOTE — PROGRESS NOTES
PT INITIAL EVALUATION NOTE - [facility] 2-15    Patient Name: Cydney Dire  Date:10/16/2019  : 1980  [x]  Patient  Verified  Payor: BLUE CROSS / Plan: Berkleyzina KESHAV Sam [id number] PPO / Product Type: PPO /    In time: 1:00 pm  Out time: 2:00 pm  Total Treatment Time (min): 60  Total Timed Codes (min): 15  1:1 Treatment Time ( only): 15   Visit #: 1     Treatment Area: Pain in left knee [M25.562]    SUBJECTIVE  Pain Level (0-10 scale): 2/10  Any medication changes, allergies to medications, adverse drug reactions, diagnosis change, or new procedure performed?: [] No    [x] Yes (see summary sheet for update)  Subjective:    *See 2018 Evaluation*  Currently feeling like the L knee does not feel good and doesn't feel like it is normal. He reports that he is feeling like it isn't as stable and feels like he cannot squat and feels pressure under his L knee cap.   PLOF: running, work as , care of child, swimming, weights, cycling  Mechanism of Injury: insidious onste  Previous Treatment/Compliance: ANTONIO at this location  PMHx/Surgical Hx: see in [facility]  Work Hx: sedentary  Living Situation: with wife and young child with another child on the way  Pt Goals: no knee pain and the ability to be able to exercise without symptoms  Barriers: none noted  Motivation: good to excellent  Substance use: alcohol  FABQ Score: see FOTO  Cognition: A & O x 4        OBJECTIVE/EXAMINATION  Postural Restoration [place] (ANTONIO) Evaluation    Posture: bilateral knee hyperextended tendency with ant pelvic tilt  Other Observations: pt is of normal body weight              Left   Right  Standing  Standing Reach Test (M)    7 inches     Functional Squat Test  (P)    Level 3 with v.c.        Sitting  FA IR R.O.M. (M)     52 degrees  52 degrees  FA ER R.O.M.  (M)       FA IR Strength (M)       FA ER Strength (M)         Sidelying  Adduction Drop Test (M)    +   -  Hruska Adduction Lift Test (M)   1   2  Hruska Abduction Lift Test (M)        Pelvic [place] Drop Test (PADT) (P)  +   +  Passive Abduction Raise Test (PART) (P)       Passive IP ER Expansion Test (P)          Supine  Extension Drop Test (M)         Trunk Rotation (M)       Straight Leg Raise (M)       Apical Expansion (R)     Limited minimally     Horizontal Abduction (R)    30 degrees  60 degrees  Shoulder Flexion (R)       HG IR (R)      56 degrees  65 degrees  Subclavius Flexibility (R)    Min limited  Min limited  Elevated and Externally Rotated Ant.  Ribs (R) L > R       CERVICAL-CRANIO-MANDIBULAR  Cervical Axial Rotation       Mandibular Opening       Mandibular Lateral Trusion with Protrusion    Cervical Extension     Min flexible       15 min Neuromuscular Re-education:  []  See flow sheet :   Rationale: increase ROM, increase strength, improve coordination, improve balance and increase proprioception  to improve the patients ability to walk without pain        With   [] TE   [] TA   [x] neuro   [] other: Patient Education: [x] Review HEP    [x] Progressed/Changed HEP based on:   [] positioning   [] body mechanics   [] transfers   [] heat/ice application    [] other:      Other Objective/Functional Measures: see FOTO scanned into chart    Pain Level (0-10 scale) post treatment: 0/10    ASSESSMENT/Changes in Function:     [x]  See Plan of Care      Heena Munson, PT, DPT, [facility] 10/16/2019

## 2019-10-16 NOTE — PROGRESS NOTES
[address] [facility] [address]  Phone: [phone]  Fax: [phone]    Plan of Care/Statement of Necessity for Physical Therapy Services  2-15    Patient name: Edna Valdes  : 1980  Provider#: [id number]  Referral source: Referred, Self, MD      Medical/Treatment Diagnosis: Pain in left knee [M25.562]     Prior Hospitalization: see medical history     Comorbidities: visual impairment, previous back and knee pn  Prior Level of Function: full duty work in sedentary job, , running, cycling, weights, swimming  Medications: Verified on Patient Summary List  Start of Care: 10/16/2019      Onset Date: ongoing x several months   The Plan of Care and following information is based on the information from the initial evaluation. Assessment/ nath information: Leigh Ann Green presents to our office with postural dysfunction, limited bilateral reciprocal movment ability and inability to ambulate without pain. He will benefit from skilled PT prior to D/C to address the below, reduce knee pain and allow full ADL/IADL performance without restriction.     Evaluation Complexity History MEDIUM  Complexity : 1-2 comorbidities / personal factors will impact the outcome/ POC ; Examination HIGH Complexity : 4+ Standardized tests and measures addressing body structure, function, activity limitation and / or participation in recreation  ;Presentation LOW Complexity : Stable, uncomplicated  ;Clinical Decision Making MEDIUM Complexity : FOTO score of 26-74  Overall Complexity Rating: LOW     Problem List: pain affecting function, decrease ROM, decrease strength, impaired gait/ balance, decrease ADL/ functional abilitiies, decrease activity tolerance, decrease flexibility/ joint mobility and decrease transfer abilities   Treatment Plan may include any combination of the following: Therapeutic exercise, Therapeutic activities, Neuromuscular re-education, Gait/balance training, Manual therapy, Patient education, Self Care training, Functional mobility training, Home safety training and Stair training  Patient / Family readiness to learn indicated by: asking questions, trying to perform skills and interest  Persons(s) to be included in education: patient (P)  Barriers to Learning/Limitations: None  Patient Goal (s): to not have knee pain  Patient Self Reported Health Status: excellent  Rehabilitation Potential: good    Short Term Goals: To be accomplished in 3 treatments:  1) Patient will demonstrate Negative Hruska Adduction Drop Test   2) Patient will demonstrate independence with HEP  3) Patient will demonstrate greater than Grade II on Hruska Adduction Lift Test    Long Term Goals: To be accomplished in 10 treatments:  1)Patient will demonstrate Grade IV or Grade V Hruska Adduction Lift Score to allow for functional mobility in home and community settings  2)Pt will demonstrate the ability to ambulate forward and backward achieving bilateral Acetabular Femoral Internal Rotation for pain free mobility  3)Pt will demonstrate the ability to run without subjective complaints or gait deviation  4)Pt will demonstrate independence with discharge HEP to allow for ambulation, sitting and standing without objective dysfunction    Frequency / Duration: Patient to be seen 1 times per week for 10 treatments. Patient/ Caregiver education and instruction: self care, activity modification, brace/ splint application and exercises    [x]  Plan of care has been reviewed with PTA        Certification Period: 90 days    Lisa Gregorio, PT, DPT, [facility] 10/16/2019     ________________________________________________________________________    I certify that the above Therapy Services are being furnished while the patient is under my care. I agree with the treatment plan and certify that this therapy is necessary.     [de-identified] Signature:____________________  Date:____________Time: _________

## 2019-10-25 ENCOUNTER — HOSPITAL ENCOUNTER (OUTPATIENT)
Dept: PHYSICAL THERAPY | Age: 39
Discharge: HOME | End: 2019-10-25
Payer: COMMERCIAL

## 2019-10-25 PROCEDURE — 97112 NEUROMUSCULAR REEDUCATION: CPT | Performed by: PHYSICAL THERAPIST

## 2019-10-25 NOTE — PROGRESS NOTES
PT DAILY TREATMENT NOTE - [facility] 2-15    Patient Name: Saul Peterson  Date:10/25/2019  : 1980  [x]  Patient  Verified  Payor: Christian Melvin / Plan: [place] Sam [id number] PPO / Product Type: PPO /    In time:8:35 am   Out time:9:30 am   Total Treatment Time (min): 55  Total Timed Codes (min): 55  1:1 Treatment Time ( only): 45   Visit #:  2    Treatment Area: Pain in left knee [M25.562]    SUBJECTIVE  Pain Level (0-10 scale): 0/10  Any medication changes, allergies to medications, adverse drug reactions, diagnosis change, or new procedure performed?: [x] No    [] Yes (see summary sheet for update)  Subjective functional status/changes:   [] No changes reported  Pt reports that he had less knee pain when he ran the last few days. He does not have back tightness. His HEP went ok. OBJECTIVE     55 min Neuromuscular Re-education:  [x]  See flow sheet :   Rationale: increase ROM, increase strength, improve coordination, improve balance and increase proprioception  to improve the patients ability to return to N ADL skills            With   [] TE   [] TA   [] neuro   [] other: Patient Education: [x] Review HEP    [] Progressed/Changed HEP based on:   [] positioning   [] body mechanics   [] transfers   [] heat/ice application    [] other:      Other Objective/Functional Measures:   Beginning of session   - HGIR bilaterally  L Horiz Abd 45, R 65 (corrected to equal)  Min + HaDDT L, - R (corrected to - bilaterally)  Min + PADT L, - R (corrected to - bilaterally)     Pain Level (0-10 scale) post treatment: 0/10      ASSESSMENT/Changes in Function:   Pt was compliant with HEP since initial visit.      Patient will continue to benefit from skilled PT services to modify and progress therapeutic interventions, address functional mobility deficits, address ROM deficits, address strength deficits, analyze and address soft tissue restrictions, analyze and cue movement patterns, analyze and modify body mechanics/ergonomics, assess and modify postural abnormalities and instruct in home and community integration to attain remaining goals.      []  See Plan of Care  []  See progress note/recertification  []  See Discharge Summary         Progress towards goals / Updated goals:  Progressing toward all STG and LTG    PLAN  []  Upgrade activities as tolerated     [x]  Continue plan of care  [x]  Update interventions per flow sheet       []  Discharge due to:_  []  Other:_        Nelson Gonzalez PT, DPT, [facility]  10/25/2019

## 2019-11-01 ENCOUNTER — APPOINTMENT (OUTPATIENT)
Dept: PHYSICAL THERAPY | Age: 39
End: 2019-11-01

## 2019-12-10 ENCOUNTER — HOSPITAL ENCOUNTER (OUTPATIENT)
Dept: PHYSICAL THERAPY | Age: 39
Discharge: HOME | End: 2019-12-10
Payer: COMMERCIAL

## 2019-12-10 PROCEDURE — 97112 NEUROMUSCULAR REEDUCATION: CPT | Performed by: PHYSICAL THERAPIST

## 2019-12-10 NOTE — PROGRESS NOTES
PT DAILY TREATMENT NOTE - [facility] 2-15    Patient Name: Demar Marie  Date:12/10/2019  : 1980  [x]  Patient  Verified  Payor: BLUE CROSS / Plan: [place] Sam [id number] PPO / Product Type: PPO /    In time:10:35 am   Out time:11:30 am   Total Treatment Time (min): 55  Total Timed Codes (min): 55  1:1 Treatment Time ( only): 39   Visit #:  3    Treatment Area: Pain in left knee [M25.562]    SUBJECTIVE  Pain Level (0-10 scale): 0/10  Any medication changes, allergies to medications, adverse drug reactions, diagnosis change, or new procedure performed?: [x] No    [] Yes (see summary sheet for update)  Subjective functional status/changes:   [] No changes reported  Pt reports that he had shingles and also his wife had a baby. He has recently shifted shoes and feels like his knee pain might be a bit better. OBJECTIVE     55 min Neuromuscular Re-education:  [x]  See flow sheet :   Rationale: increase ROM, increase strength, improve coordination, improve balance and increase proprioception  to improve the patients ability to return to N ADL skills            With   [] TE   [] TA   [x] neuro   [] other: Patient Education: [x] Review HEP    [x] Progressed/Changed HEP based on:   [] positioning   [] body mechanics   [] transfers   [] heat/ice application    [] other:      Other Objective/Functional Measures:   Beginning of session   - HGIR bilaterally  L Horiz Abd 39, R 65 80 (corrected after intervention)  Min + HaDDT L, - R   Min + PADT L, - R   Apical expansion fair  Ambulation in clinic setting from neutral position with retesting: was out of neutral. Was not correct shoe for patient.      Pain Level (0-10 scale) post treatment: 0/10      ASSESSMENT/Changes in Function:   Patient will continue to benefit from skilled PT services to modify and progress therapeutic interventions, address functional mobility deficits, address ROM deficits, address strength deficits, analyze and address soft tissue restrictions, analyze and cue movement patterns, analyze and modify body mechanics/ergonomics, assess and modify postural abnormalities and instruct in home and community integration to attain remaining goals.      []  See Plan of Care  [x]  See progress note/recertification  []  See Discharge Summary         Progress towards goals / Updated goals:  See progress note    PLAN  []  Upgrade activities as tolerated     [x]  Continue plan of care  [x]  Update interventions per flow sheet       []  Discharge due to:_  []  Other:_        Pamella Cogan, PT, DPT, [facility]  12/10/2019

## 2019-12-10 NOTE — PROGRESS NOTES
[address]  Phone: [phone]  Fax: [phone]    Progress Note    Patient name: Guanako Lopez  : 1980  Provider#: [id number]  Referral source: Adela Leventhal, MD      Medical/Treatment Diagnosis: Pain in left knee [M25.562]     Prior Hospitalization: see medical history     Comorbidities: visual impairment, previous back and knee pn  Prior Level of Function: full duty work in sedentary job, , running, cycling, weights, swimming  Medications: Verified on Patient Summary List  Start of Care: 10/16/2019      Visits since start of care: 3        Assessment/ key information: Zachary Devries resumes PT today following a hiatus for his child's birth and also for a case of shingles. He will continue to require skilled PT for attainment of LTG and STG as listed below to allow for ADL and ambulation without pain present. Evaluation Complexity History MEDIUM  Complexity : 1-2 comorbidities / personal factors will impact the outcome/ POC ; Examination HIGH Complexity : 4+ Standardized tests and measures addressing body structure, function, activity limitation and / or participation in recreation  ;Presentation LOW Complexity : Stable, uncomplicated  ;Clinical Decision Making MEDIUM Complexity : FOTO score of 26-74  Overall Complexity Rating: LOW     Problem List: pain affecting function, decrease ROM, decrease strength, impaired gait/ balance, decrease ADL/ functional abilitiies, decrease activity tolerance, decrease flexibility/ joint mobility and decrease transfer abilities   Treatment Plan may include any combination of the following: Therapeutic exercise, Therapeutic activities, Neuromuscular re-education, Gait/balance training, Manual therapy, Patient education, Self Care training, Functional mobility training, Home safety training and Stair training      Short Term Goals:  To be accomplished in 3 treatments: progressing toward  1) Patient will demonstrate Negative Hruska Adduction Drop Test   2) Patient will demonstrate independence with HEP  3) Patient will demonstrate greater than Grade II on Hruska Adduction Lift Test    Long Term Goals: To be accomplished in 10 treatments:  1)Patient will demonstrate Grade IV or Grade V Hruska Adduction Lift Score to allow for functional mobility in home and community settings  2)Pt will demonstrate the ability to ambulate forward and backward achieving bilateral Acetabular Femoral Internal Rotation for pain free mobility  3)Pt will demonstrate the ability to run without subjective complaints or gait deviation  4)Pt will demonstrate independence with discharge HEP to allow for ambulation, sitting and standing without objective dysfunction    Frequency / Duration: Patient to be seen 1 times per week for 8 more treatments. Patient/ Caregiver education and instruction: self care, activity modification, brace/ splint application and exercises    [x]  Plan of care has been reviewed with PTA        Certification Period: 90 days from initial evaluation 10/16/19    Saeid Page PT, DPT, [facility] 12/10/2019     ________________________________________________________________________    I certify that the above Therapy Services are being furnished while the patient is under my care. I agree with the treatment plan and certify that this therapy is necessary.     [de-identified] Signature:____________________  Date:____________Time: _________

## 2019-12-16 ENCOUNTER — HOSPITAL ENCOUNTER (OUTPATIENT)
Dept: PHYSICAL THERAPY | Age: 39
Discharge: HOME | End: 2019-12-16
Payer: COMMERCIAL

## 2019-12-16 PROCEDURE — 97112 NEUROMUSCULAR REEDUCATION: CPT | Performed by: PHYSICAL THERAPIST

## 2019-12-16 NOTE — PROGRESS NOTES
PT DAILY TREATMENT NOTE - [facility] 2-15    Patient Name: Alvaro James  Date:2019  : 1980  [x]  Patient  Verified  Payor: BLUE CROSS / Plan: [place] Sam [id number] PPO / Product Type: PPO /    In time: 2:00 pm   Out time: 3:00 pm   Total Treatment Time (min): 60  Total Timed Codes (min): 50  1:1 Treatment Time ( only): 45   Visit #:  4    Treatment Area: Pain in left knee [M25.562]    SUBJECTIVE  Pain Level (0-10 scale): 0/10  Any medication changes, allergies to medications, adverse drug reactions, diagnosis change, or new procedure performed?: [x] No    [] Yes (see summary sheet for update)  Subjective functional status/changes:   [] No changes reported  Pt reports that he hasn't run and his knee is feeling a bit better. OBJECTIVE     60 min Neuromuscular Re-education:  [x]  See flow sheet :   Rationale: increase ROM, increase strength, improve coordination, improve balance and increase proprioception  to improve the patients ability to return to N ADL skills            With   [] TE   [] TA   [x] neuro   [] other: Patient Education: [x] Review HEP    [x] Progressed/Changed HEP based on:   [] positioning   [] body mechanics   [] transfers   [] heat/ice application    [] other:      Other Objective/Functional Measures:   Beginning of session   - HGIR bilaterally  L Horiz Abd 50 R 75 (corrected after intervention)   - HaDDT L, - R   - PADT L, - R   Apical expansion improving  Ambulation in clinic setting from neutral position with retesting:testing were good for maintaining neutrality in [employer] [employer].      Pain Level (0-10 scale) post treatment: 0/10      ASSESSMENT/Changes in Function:   Patient will continue to benefit from skilled PT services to modify and progress therapeutic interventions, address functional mobility deficits, address ROM deficits, address strength deficits, analyze and address soft tissue restrictions, analyze and cue movement patterns, analyze and modify body mechanics/ergonomics, assess and modify postural abnormalities and instruct in home and community integration to attain remaining goals.      []  See Plan of Care  [x]  See progress note/recertification  []  See Discharge Summary         Progress towards goals / Updated goals:  See progress note    PLAN  []  Upgrade activities as tolerated     [x]  Continue plan of care  [x]  Update interventions per flow sheet       []  Discharge due to:_  []  Other:_        Kirstie Weems, PT, DPT, [facility]  12/16/2019

## 2019-12-31 ENCOUNTER — HOSPITAL ENCOUNTER (OUTPATIENT)
Dept: PHYSICAL THERAPY | Age: 39
Discharge: HOME | End: 2019-12-31
Payer: COMMERCIAL

## 2019-12-31 PROCEDURE — 97116 GAIT TRAINING THERAPY: CPT | Performed by: PHYSICAL THERAPIST

## 2019-12-31 PROCEDURE — 97112 NEUROMUSCULAR REEDUCATION: CPT | Performed by: PHYSICAL THERAPIST

## 2020-01-08 ENCOUNTER — HOSPITAL ENCOUNTER (OUTPATIENT)
Dept: PHYSICAL THERAPY | Age: 40
Discharge: HOME | End: 2020-01-08
Payer: COMMERCIAL

## 2020-01-08 PROCEDURE — 97112 NEUROMUSCULAR REEDUCATION: CPT | Performed by: PHYSICAL THERAPIST

## 2020-01-08 NOTE — PROGRESS NOTES
PT DAILY TREATMENT NOTE - [facility] 2-15    Patient Name: Rah Copeland  Date:2020  : 1980  [x]  Patient  Verified  Payor: [employer] / Plan: [place] Sam [id number] PPO / Product Type: PPO /    In time: 10:00 am   Out time: 10:45 am   Total Treatment Time (min): 45  Total Timed Codes (min): 45  1:1 Treatment Time ( only): 45   Visit #:  6    Treatment Area: Pain in left knee [M25.562]    SUBJECTIVE  Pain Level (0-10 scale): 0/10  Any medication changes, allergies to medications, adverse drug reactions, diagnosis change, or new procedure performed?: [x] No    [] Yes (see summary sheet for update)  Subjective functional status/changes:   [] No changes reported  Pt reports that his back is feeling pretty tight, but he hasn't had a lot of knee pain. He has another pair of shoes that he would like to try out today.      OBJECTIVE     30 min Neuromuscular Re-education:  [x]  See flow sheet :   Rationale: increase ROM, increase strength, improve coordination, improve balance and increase proprioception  to improve the patients ability to return to N ADL skills    15 min Gait Training(neuromuscular re-education): shoe test and retest and UE swing as well as stance and swing phases of gait cued   Rationale: increase ROM, increase strength, improve coordination, improve balance and increase proprioception  to improve the patients ability to return to N ADL skills            With   [] TE   [] TA   [x] neuro   [] other: Patient Education: [x] Review HEP    [x] Progressed/Changed HEP based on:   [] positioning   [] body mechanics   [] transfers   [] heat/ice application    [] other:      Other Objective/Functional Measures:   Beginning of session   - HGIR bilaterally  L Horiz Abd 70 R 70 (L end feel was a bit tighter but distance was the same)  - HaDDT L, - R    - PADT L, - R   [employer] Gel Cumulus 21 is proper shoe for him       Pain Level (0-10 scale) post treatment: 0/10      ASSESSMENT/Changes in Function: Patient will continue to benefit from skilled PT services to modify and progress therapeutic interventions, address functional mobility deficits, address ROM deficits, address strength deficits, analyze and address soft tissue restrictions, analyze and cue movement patterns, analyze and modify body mechanics/ergonomics, assess and modify postural abnormalities and instruct in home and community integration to attain remaining goals.      []  See Plan of Care  []  See progress note/recertification  []  See Discharge Summary         Progress towards goals / Updated goals:  See above objective info  All STG met      PLAN  []  Upgrade activities as tolerated     [x]  Continue plan of care  [x]  Update interventions per flow sheet       []  Discharge due to:_  [x]  Other:_  Progress note at next visit      Yolanda Galeano, PT, DPT, [facility]  1/8/2020

## 2020-01-10 ENCOUNTER — APPOINTMENT (OUTPATIENT)
Dept: PHYSICAL THERAPY | Age: 40
End: 2020-01-10
Payer: COMMERCIAL

## 2020-03-27 NOTE — ANCILLARY DISCHARGE INSTRUCTIONS
[address] [place]  Phone: [phone]  Fax: [phone]    Discharge Summary  2-15    Patient name: Mariam Szymanski  : 1980  Provider#: [id number]  Referral source: Oniel Curtis MD      Medical/Treatment Diagnosis: Pain in left knee [M25.562]     Prior Hospitalization: see medical history     Comorbidities: See Plan of Care  Prior Level of Function:See Plan of Care  Medications: Verified on Patient Summary List    Start of Care: 10/16/2020                   Onset Date:ongoing for several months prior to evaluation    Visits from Start of Care: 6     Missed Visits: 2  Reporting Period : 10/16/2019 to 2020      ASSESSMENT/SUMMARY OF CARE: Nuris Tirado progressed well initially through his POC in PT, however following a hiatus for his child's birth and also for a case of shingles, progress was slow and ability to attend were limited by work restrictions. He continues to have knee pain for which he may seek out a second opinion with an orthopedic surgeon. At this time, he will be discharged from treatment.      Goal:1) Patient will demonstrate Negative Hruska Adduction Drop Test   Status at last note/certification:  Status at discharge: met    Goal:2) Patient will demonstrate independence with HEP  Status at last note/certification:  Status at discharge: met    Goal:3) Patient will demonstrate greater than Grade II on Hruska Adduction Lift Test  Status at last note/certification:  Status at discharge: met    Long Term Goals:   1)Patient will demonstrate Grade IV or Grade V Hruska Adduction Lift Score to allow for functional mobility in home and community settings  2)Pt will demonstrate the ability to ambulate forward and backward achieving bilateral Acetabular Femoral Internal Rotation for pain free mobility  3)Pt will demonstrate the ability to run without subjective complaints or gait deviation  4)Pt will demonstrate independence with discharge HEP to allow for ambulation, sitting and standing without objective dysfunction  Status at last note/certification: not met  Status at discharge: not met however was progressing toward slowly at last visit         RECOMMENDATIONS:  [x]Discontinue therapy: []Patient has reached or is progressing toward set goals      [x]Patient  has abdicated      []Due to lack of appreciable progress towards set goals      []Other    Sherron Escobar, PT, DPT, [facility]    3/27/2020

## 2024-01-10 ENCOUNTER — HOSPITAL ENCOUNTER (OUTPATIENT)
Facility: HOSPITAL | Age: 44
Setting detail: RECURRING SERIES
Discharge: HOME | End: 2024-01-13
Payer: COMMERCIAL

## 2024-01-10 PROCEDURE — 97161 PT EVAL LOW COMPLEX 20 MIN: CPT | Performed by: PHYSICAL THERAPIST

## 2024-01-10 PROCEDURE — 97112 NEUROMUSCULAR REEDUCATION: CPT | Performed by: PHYSICAL THERAPIST

## 2024-01-10 NOTE — THERAPY EVALUATION
Stim unattended, [id number] Gait Training, and [id number] Orthotic Management and Training  Patient / Family readiness to learn indicated by: asking questions, trying to perform skills, interest, return verbalization , and return demonstration   Persons(s) to be included in education: patient (P)  Barriers to Learning/Limitations: none  Measures taken if barriers to learning present: none  Patient Self Reported Health Status: excellent  Rehabilitation Potential: excellent    Short Term Goals: To be accomplished in 4 treatments.  Patient will demonstrate Negative Hruska Adduction Drop Test (Lico Test) to allow for sit to stand transfer without compensation or assistance needed   Patient will demonstrate independence with HEP without v.c. to allow for car transfers and showering without compensation or pain  Patient will demonstrate greater than Grade II on Hruska Adduction Lift Test to demonstrate ambulation of a minimum of 500 ft with min compensatory deviations and v.c. needed for form correction   Long Term Goals: To be accomplished in 16 treatments.  Patient will demonstrate Grade IV or Grade V Hruska Adduction Lift Score to allow for ambulation x 500 ft and mobility in home and community settings x 250 ft  Pt will demonstrate shoulder, neck and hip A/PROM bilaterally equal and without pain to allow for overhead reaching to a high shelf and a sit to stand transfer without UE assistance needed  Pt will demonstrate the ability to play with kids on the floor as evidenced by the achievement of Level 5 squat objective measure   Pt will demonstrate independence with discharge HEP, full AROM UE and LE, a min of 4/5 MMT to allow for ambulation, sitting and standing as required for all ADL and hygienic self care skills without objective dysfunction or assistance needed     Frequency / Duration: Patient to be seen 2 times per week for 16 treatments.    Patient/ Caregiver education and instruction: Diagnosis, prognosis, self care,

## 2024-01-19 ENCOUNTER — HOSPITAL ENCOUNTER (OUTPATIENT)
Facility: HOSPITAL | Age: 44
Setting detail: RECURRING SERIES
Discharge: HOME | End: 2024-01-22
Payer: COMMERCIAL

## 2024-01-19 PROCEDURE — 97112 NEUROMUSCULAR REEDUCATION: CPT | Performed by: PHYSICAL THERAPIST

## 2024-02-06 ENCOUNTER — HOSPITAL ENCOUNTER (OUTPATIENT)
Facility: HOSPITAL | Age: 44
Setting detail: RECURRING SERIES
Discharge: HOME | End: 2024-02-09
Payer: COMMERCIAL

## 2024-02-06 PROCEDURE — 97112 NEUROMUSCULAR REEDUCATION: CPT | Performed by: PHYSICAL THERAPIST

## 2024-02-06 NOTE — PROGRESS NOTES
PHYSICAL THERAPY - MEDICARE DAILY TREATMENT NOTE (updated 3/23)      Date: 2024          Patient Name:  Kelton Velarde :  1980   Medical   Diagnosis:  Muscle weakness (generalized) [M62.81] Treatment Diagnosis:  M25.551  RIGHT HIP PAIN    Referral Source:  Referral, Self Insurance:   Payor: CHRISTINA / Plan: [facility] BCJUL[facility] [place] / Product Type: *No Product type* /                     Patient  verified yes     Visit #   Current  / Total 3 16   Time   In / Out 1146 A 1230   Total Treatment Time 44   Total Timed Codes 44   1:1 Treatment Time 44      [facility] Totals Reminder:  bill using total billable   min of TIMED therapeutic procedures and modalities.   8-22 min = 1 unit; 23-37 min = 2 units; 38-52 min = 3 units; 53-67 min = 4 units; 68-82 min = 5 units            SUBJECTIVE    Pain Level (0-10 scale): 1-2/10    Any medication changes, allergies to medications, adverse drug reactions, diagnosis change, or new procedure performed?: [x] No    [] Yes (see summary sheet for update)  Medications: Verified on Patient Summary List    Subjective functional status/changes:     Pt reports that it is a bit better, however when trying to run 2 days consecutively, there is still R glute pn and still discomfort on his L side.    OBJECTIVE      Therapeutic Procedures:  Tx Min Billable or 1:1 Min (if diff from Tx Min) Procedure, Rationale, Specifics   44  [id number] Neuromuscular Re-Education (timed):  improve balance, coordination, kinesthetic sense, posture, core stability and proprioception to improve patient's ability to develop conscious control of individual muscles and awareness of position of extremities in order to progress to PLOF and address remaining functional goals. (see flow sheet as applicable)     Details if applicable:            Details if applicable:     44 44    Total Total               [x]  Patient Education billed concurrently with other procedures   [x] Review HEP    [] Progressed/Changed HEP, detail:    []

## 2024-02-20 ENCOUNTER — HOSPITAL ENCOUNTER (OUTPATIENT)
Facility: HOSPITAL | Age: 44
Setting detail: RECURRING SERIES
Discharge: HOME | End: 2024-02-23
Payer: COMMERCIAL

## 2024-02-20 PROCEDURE — 97112 NEUROMUSCULAR REEDUCATION: CPT | Performed by: PHYSICAL THERAPIST

## 2024-02-20 NOTE — PROGRESS NOTES
PHYSICAL THERAPY - MEDICARE DAILY TREATMENT NOTE (updated 3/23)      Date: 2024          Patient Name:  Kelton Velarde :  1980   Medical   Diagnosis:  Muscle weakness (generalized) [M62.81] Treatment Diagnosis:  M25.551  RIGHT HIP PAIN    Referral Source:  Referral, Self Insurance:   Payor: CHRISTINA / Plan: [facility] [place] / Product Type: *No Product type* /                     Patient  verified yes     Visit #   Current  / Total 4 16   Time   In / Out 848 A 935 A   Total Treatment Time 47   Total Timed Codes 47   1:1 Treatment Time 47      [facility] Totals Reminder:  bill using total billable   min of TIMED therapeutic procedures and modalities.   8-22 min = 1 unit; 23-37 min = 2 units; 38-52 min = 3 units; 53-67 min = 4 units; 68-82 min = 5 units            SUBJECTIVE    Pain Level (0-10 scale): 2/10    Any medication changes, allergies to medications, adverse drug reactions, diagnosis change, or new procedure performed?: [x] No    [] Yes (see summary sheet for update)  Medications: Verified on Patient Summary List    Subjective functional status/changes:     Pt reports some frustration that he took 4 days off and there was still R buttock discomfort when he went to try to run again. He has been doing his HEP regularly.    OBJECTIVE      Therapeutic Procedures:  Tx Min Billable or 1:1 Min (if diff from Tx Min) Procedure, Rationale, Specifics   47  68029 Neuromuscular Re-Education (timed):  improve balance, coordination, kinesthetic sense, posture, core stability and proprioception to improve patient's ability to develop conscious control of individual muscles and awareness of position of extremities in order to progress to PLOF and address remaining functional goals. (see flow sheet as applicable)     Details if applicable:            Details if applicable:     47 47    Total Total           [x]  Patient Education billed concurrently with other procedures   [x] Review HEP    [] Progressed/Changed HEP,

## 2024-03-06 ENCOUNTER — HOSPITAL ENCOUNTER (OUTPATIENT)
Facility: HOSPITAL | Age: 44
Setting detail: RECURRING SERIES
Discharge: HOME | End: 2024-03-09
Payer: COMMERCIAL

## 2024-03-06 PROCEDURE — 97112 NEUROMUSCULAR REEDUCATION: CPT | Performed by: PHYSICAL THERAPIST

## 2024-03-06 NOTE — PROGRESS NOTES
PHYSICAL THERAPY - MEDICARE DAILY TREATMENT NOTE (updated 3/23)      Date: 3/6/2024          Patient Name:  Kelton Velarde :  1980   Medical   Diagnosis:  Muscle weakness (generalized) [M62.81] Treatment Diagnosis:  M25.551  RIGHT HIP PAIN    Referral Source:  Referral, Self Insurance:   Payor: CHRISTINA / Plan: [facility] BC[facility] [place] / Product Type: *No Product type* /                     Patient  verified yes     Visit #   Current  / Total 5 16   Time   In / Out 1145 A 1230 P   Total Treatment Time 45   Total Timed Codes 45   1:1 Treatment Time 45      [facility] Totals Reminder:  bill using total billable   min of TIMED therapeutic procedures and modalities.   8-22 min = 1 unit; 23-37 min = 2 units; 38-52 min = 3 units; 53-67 min = 4 units; 68-82 min = 5 units            SUBJECTIVE    Pain Level (0-10 scale): 0/10    Any medication changes, allergies to medications, adverse drug reactions, diagnosis change, or new procedure performed?: [x] No    [] Yes (see summary sheet for update)  Medications: Verified on Patient Summary List    Subjective functional status/changes:     Pt reports that he is feeling better overall. He is able to run more with less pn. He does feel glute fatigue and some L HS discomfort somewhat, but has run 3 days in a row without a lot of trouble.     OBJECTIVE      Therapeutic Procedures:  Tx Min Billable or 1:1 Min (if diff from Tx Min) Procedure, Rationale, Specifics   45  [id number] Neuromuscular Re-Education (timed):  improve balance, coordination, kinesthetic sense, posture, core stability and proprioception to improve patient's ability to develop conscious control of individual muscles and awareness of position of extremities in order to progress to PLOF and address remaining functional goals. (see flow sheet as applicable)     Details if applicable:            Details if applicable:     45 45    Total Total           [x]  Patient Education billed concurrently with other procedures   [x] Review

## 2024-03-26 ENCOUNTER — HOSPITAL ENCOUNTER (OUTPATIENT)
Facility: HOSPITAL | Age: 44
Setting detail: RECURRING SERIES
Discharge: HOME | End: 2024-03-29
Payer: COMMERCIAL

## 2024-03-26 PROCEDURE — 97112 NEUROMUSCULAR REEDUCATION: CPT | Performed by: PHYSICAL THERAPIST

## 2024-03-26 NOTE — PROGRESS NOTES
PHYSICAL THERAPY - MEDICARE DAILY TREATMENT NOTE (updated 3/23)      Date: 3/26/2024          Patient Name:  Kelton Velarde :  1980   Medical   Diagnosis:  Muscle weakness (generalized) [M62.81] Treatment Diagnosis:  M25.551  RIGHT HIP PAIN    Referral Source:  Referral, Self Insurance:   Payor: CHRISTINA / Plan: [facility] [place] / Product Type: *No Product type* /                     Patient  verified yes     Visit #   Current  / Total 6 16   Time   In / Out 945 A 1030 A   Total Treatment Time 45   Total Timed Codes 45   1:1 Treatment Time 45      [facility] Totals Reminder:  bill using total billable   min of TIMED therapeutic procedures and modalities.   8-22 min = 1 unit; 23-37 min = 2 units; 38-52 min = 3 units; 53-67 min = 4 units; 68-82 min = 5 units            SUBJECTIVE    Pain Level (0-10 scale): 0/10    Any medication changes, allergies to medications, adverse drug reactions, diagnosis change, or new procedure performed?: [x] No    [] Yes (see summary sheet for update)  Medications: Verified on Patient Summary List    Subjective functional status/changes:     Pt reports that he has been feeling good overall. He has been able to increase his running and did 60 miles in the last 2 weeks or so.   He is still feeling tightness in his L HS during the exercises, during the day and when running.       OBJECTIVE      Therapeutic Procedures:  Tx Min Billable or 1:1 Min (if diff from Tx Min) Procedure, Rationale, Specifics   45  13644 Neuromuscular Re-Education (timed):  improve balance, coordination, kinesthetic sense, posture, core stability and proprioception to improve patient's ability to develop conscious control of individual muscles and awareness of position of extremities in order to progress to PLOF and address remaining functional goals. (see flow sheet as applicable)     Details if applicable:            Details if applicable:     45 45    Total Total           [x]  Patient Education billed

## 2024-04-17 ENCOUNTER — HOSPITAL ENCOUNTER (OUTPATIENT)
Facility: HOSPITAL | Age: 44
Setting detail: RECURRING SERIES
Discharge: HOME | End: 2024-04-20
Payer: COMMERCIAL

## 2024-04-17 PROCEDURE — 97112 NEUROMUSCULAR REEDUCATION: CPT | Performed by: PHYSICAL THERAPIST

## 2024-04-17 NOTE — PROGRESS NOTES
PHYSICAL THERAPY - MEDICARE DAILY TREATMENT NOTE (updated 3/23)      Date: 2024          Patient Name:  Kelton Velarde :  1980   Medical   Diagnosis:  Muscle weakness (generalized) [M62.81] Treatment Diagnosis:  M25.551  RIGHT HIP PAIN    Referral Source:  Referral, Self Insurance:   Payor: CHRISTINA / Plan: [facility] [place] / Product Type: *No Product type* /                     Patient  verified yes     Visit #   Current  / Total 7 16   Time   In / Out 1101 A 1150 A   Total Treatment Time 49   Total Timed Codes 49   1:1 Treatment Time 49      [facility] Totals Reminder:  bill using total billable   min of TIMED therapeutic procedures and modalities.   8-22 min = 1 unit; 23-37 min = 2 units; 38-52 min = 3 units; 53-67 min = 4 units; 68-82 min = 5 units            SUBJECTIVE    Pain Level (0-10 scale): 0/10    Any medication changes, allergies to medications, adverse drug reactions, diagnosis change, or new procedure performed?: [x] No    [] Yes (see summary sheet for update)  Medications: Verified on Patient Summary List    Subjective functional status/changes:     Pt reports that he has been feeling good overall. He feels like he is getting stronger and the exercises are going well.       OBJECTIVE      Therapeutic Procedures:  Tx Min Billable or 1:1 Min (if diff from Tx Min) Procedure, Rationale, Specifics   49  18819 Neuromuscular Re-Education (timed):  improve balance, coordination, kinesthetic sense, posture, core stability and proprioception to improve patient's ability to develop conscious control of individual muscles and awareness of position of extremities in order to progress to PLOF and address remaining functional goals. (see flow sheet as applicable)     Details if applicable:            Details if applicable:     49 49    Total Total           [x]  Patient Education billed concurrently with other procedures   [x] Review HEP    [] Progressed/Changed HEP, detail:    [] Other detail:         Other